# Patient Record
Sex: MALE | Race: BLACK OR AFRICAN AMERICAN | URBAN - METROPOLITAN AREA
[De-identification: names, ages, dates, MRNs, and addresses within clinical notes are randomized per-mention and may not be internally consistent; named-entity substitution may affect disease eponyms.]

---

## 2017-01-01 ENCOUNTER — OFFICE VISIT (OUTPATIENT)
Dept: UROLOGY | Facility: OTHER | Age: 57
End: 2017-01-01
Payer: COMMERCIAL

## 2017-01-01 ENCOUNTER — OFFICE VISIT (OUTPATIENT)
Dept: FAMILY MEDICINE | Facility: CLINIC | Age: 57
End: 2017-01-01
Payer: COMMERCIAL

## 2017-01-01 ENCOUNTER — OFFICE VISIT (OUTPATIENT)
Dept: UROLOGY | Facility: CLINIC | Age: 57
End: 2017-01-01
Payer: COMMERCIAL

## 2017-01-01 ENCOUNTER — RADIANT APPOINTMENT (OUTPATIENT)
Dept: ULTRASOUND IMAGING | Facility: CLINIC | Age: 57
End: 2017-01-01
Payer: COMMERCIAL

## 2017-01-01 VITALS
DIASTOLIC BLOOD PRESSURE: 92 MMHG | TEMPERATURE: 97.8 F | HEART RATE: 90 BPM | HEIGHT: 66 IN | WEIGHT: 162.6 LBS | BODY MASS INDEX: 26.13 KG/M2 | SYSTOLIC BLOOD PRESSURE: 142 MMHG | OXYGEN SATURATION: 100 %

## 2017-01-01 VITALS — RESPIRATION RATE: 14 BRPM | DIASTOLIC BLOOD PRESSURE: 96 MMHG | SYSTOLIC BLOOD PRESSURE: 170 MMHG | HEART RATE: 76 BPM

## 2017-01-01 VITALS — RESPIRATION RATE: 16 BRPM | DIASTOLIC BLOOD PRESSURE: 84 MMHG | HEART RATE: 80 BPM | SYSTOLIC BLOOD PRESSURE: 138 MMHG

## 2017-01-01 VITALS — DIASTOLIC BLOOD PRESSURE: 76 MMHG | RESPIRATION RATE: 16 BRPM | SYSTOLIC BLOOD PRESSURE: 118 MMHG | HEART RATE: 62 BPM

## 2017-01-01 VITALS — SYSTOLIC BLOOD PRESSURE: 124 MMHG | RESPIRATION RATE: 12 BRPM | DIASTOLIC BLOOD PRESSURE: 80 MMHG | HEART RATE: 68 BPM

## 2017-01-01 VITALS — RESPIRATION RATE: 12 BRPM | DIASTOLIC BLOOD PRESSURE: 78 MMHG | HEART RATE: 66 BPM | SYSTOLIC BLOOD PRESSURE: 120 MMHG

## 2017-01-01 DIAGNOSIS — R35.0 INCREASED FREQUENCY OF URINATION: Primary | ICD-10-CM

## 2017-01-01 DIAGNOSIS — R97.20 ELEVATED PROSTATE SPECIFIC ANTIGEN (PSA): Primary | ICD-10-CM

## 2017-01-01 DIAGNOSIS — I10 UNCONTROLLED HYPERTENSION: ICD-10-CM

## 2017-01-01 DIAGNOSIS — I10 ESSENTIAL HYPERTENSION WITH GOAL BLOOD PRESSURE LESS THAN 140/90: ICD-10-CM

## 2017-01-01 DIAGNOSIS — N40.1 BENIGN NON-NODULAR PROSTATIC HYPERPLASIA WITH LOWER URINARY TRACT SYMPTOMS: Primary | ICD-10-CM

## 2017-01-01 DIAGNOSIS — I10 HYPERTENSION GOAL BP (BLOOD PRESSURE) < 140/90: ICD-10-CM

## 2017-01-01 DIAGNOSIS — R35.0 URINARY FREQUENCY: Primary | ICD-10-CM

## 2017-01-01 DIAGNOSIS — R35.0 URINARY FREQUENCY: ICD-10-CM

## 2017-01-01 DIAGNOSIS — N40.1 BENIGN NON-NODULAR PROSTATIC HYPERPLASIA WITH LOWER URINARY TRACT SYMPTOMS: ICD-10-CM

## 2017-01-01 DIAGNOSIS — R97.20 ELEVATED PROSTATE SPECIFIC ANTIGEN (PSA): ICD-10-CM

## 2017-01-01 DIAGNOSIS — R35.0 INCREASED FREQUENCY OF URINATION: ICD-10-CM

## 2017-01-01 DIAGNOSIS — R39.9 UTI SYMPTOMS: ICD-10-CM

## 2017-01-01 LAB
ALBUMIN UR-MCNC: NEGATIVE MG/DL
APPEARANCE UR: CLEAR
BACTERIA SPEC CULT: ABNORMAL
BILIRUB UR QL STRIP: NEGATIVE
COLOR UR AUTO: YELLOW
COPATH REPORT: NORMAL
GLUCOSE UR STRIP-MCNC: NEGATIVE MG/DL
HBA1C MFR BLD: 5.6 % (ref 4.3–6)
HGB UR QL STRIP: ABNORMAL
HGB UR QL STRIP: NEGATIVE
HGB UR QL STRIP: NEGATIVE
KETONES UR STRIP-MCNC: NEGATIVE MG/DL
LEUKOCYTE ESTERASE UR QL STRIP: NEGATIVE
MICRO REPORT STATUS: ABNORMAL
MICROORGANISM SPEC CULT: ABNORMAL
NITRATE UR QL: NEGATIVE
PH UR STRIP: 5 PH (ref 5–7)
PH UR STRIP: 5.5 PH (ref 5–7)
PH UR STRIP: 6 PH (ref 5–7)
PSA SERPL-MCNC: 8.92 UG/L (ref 0–4)
PSA SERPL-MCNC: 9.41 UG/L (ref 0–4)
RBC #/AREA URNS AUTO: NORMAL /HPF (ref 0–2)
SP GR UR STRIP: 1.01 (ref 1–1.03)
SP GR UR STRIP: 1.02 (ref 1–1.03)
SP GR UR STRIP: 1.02 (ref 1–1.03)
SPECIMEN SOURCE: ABNORMAL
URN SPEC COLLECT METH UR: ABNORMAL
URN SPEC COLLECT METH UR: NORMAL
URN SPEC COLLECT METH UR: NORMAL
UROBILINOGEN UR STRIP-ACNC: 0.2 EU/DL (ref 0.2–1)
WBC #/AREA URNS AUTO: NORMAL /HPF (ref 0–2)

## 2017-01-01 PROCEDURE — 99213 OFFICE O/P EST LOW 20 MIN: CPT | Performed by: UROLOGY

## 2017-01-01 PROCEDURE — 84153 ASSAY OF PSA TOTAL: CPT | Performed by: UROLOGY

## 2017-01-01 PROCEDURE — 36415 COLL VENOUS BLD VENIPUNCTURE: CPT | Performed by: UROLOGY

## 2017-01-01 PROCEDURE — 87077 CULTURE AEROBIC IDENTIFY: CPT | Performed by: UROLOGY

## 2017-01-01 PROCEDURE — 99207 ZZC DROP WITH A PROCEDURE: CPT | Mod: 25 | Performed by: UROLOGY

## 2017-01-01 PROCEDURE — 81001 URINALYSIS AUTO W/SCOPE: CPT | Performed by: UROLOGY

## 2017-01-01 PROCEDURE — 87186 SC STD MICRODIL/AGAR DIL: CPT | Performed by: UROLOGY

## 2017-01-01 PROCEDURE — 88344 IMHCHEM/IMCYTCHM EA MLT ANTB: CPT | Performed by: UROLOGY

## 2017-01-01 PROCEDURE — 36415 COLL VENOUS BLD VENIPUNCTURE: CPT | Performed by: FAMILY MEDICINE

## 2017-01-01 PROCEDURE — 76942 ECHO GUIDE FOR BIOPSY: CPT | Performed by: UROLOGY

## 2017-01-01 PROCEDURE — 81003 URINALYSIS AUTO W/O SCOPE: CPT | Performed by: UROLOGY

## 2017-01-01 PROCEDURE — 81003 URINALYSIS AUTO W/O SCOPE: CPT | Performed by: FAMILY MEDICINE

## 2017-01-01 PROCEDURE — 83036 HEMOGLOBIN GLYCOSYLATED A1C: CPT | Performed by: FAMILY MEDICINE

## 2017-01-01 PROCEDURE — 99243 OFF/OP CNSLTJ NEW/EST LOW 30: CPT | Mod: 25 | Performed by: UROLOGY

## 2017-01-01 PROCEDURE — 51798 US URINE CAPACITY MEASURE: CPT | Performed by: UROLOGY

## 2017-01-01 PROCEDURE — 55700 HC BIOPSY PROSTATE NEEDLE/PUNCH: CPT | Performed by: UROLOGY

## 2017-01-01 PROCEDURE — 87070 CULTURE OTHR SPECIMN AEROBIC: CPT | Performed by: UROLOGY

## 2017-01-01 PROCEDURE — 88305 TISSUE EXAM BY PATHOLOGIST: CPT | Performed by: UROLOGY

## 2017-01-01 PROCEDURE — 99213 OFFICE O/P EST LOW 20 MIN: CPT | Mod: 25 | Performed by: UROLOGY

## 2017-01-01 PROCEDURE — 99213 OFFICE O/P EST LOW 20 MIN: CPT | Performed by: FAMILY MEDICINE

## 2017-01-01 PROCEDURE — 99000 SPECIMEN HANDLING OFFICE-LAB: CPT | Performed by: UROLOGY

## 2017-01-01 RX ORDER — LISINOPRIL 40 MG/1
40 TABLET ORAL DAILY
Qty: 90 TABLET | Refills: 3 | Status: SHIPPED | OUTPATIENT
Start: 2017-01-01

## 2017-01-01 RX ORDER — CIPROFLOXACIN 500 MG/1
500 TABLET, FILM COATED ORAL 2 TIMES DAILY
Qty: 6 TABLET | Refills: 0 | Status: SHIPPED | OUTPATIENT
Start: 2017-01-01 | End: 2017-01-01

## 2017-01-01 RX ORDER — OXYBUTYNIN CHLORIDE 10 MG/1
10 TABLET, EXTENDED RELEASE ORAL DAILY
Qty: 30 TABLET | Refills: 1 | Status: SHIPPED | OUTPATIENT
Start: 2017-01-01 | End: 2017-01-01

## 2017-01-01 RX ORDER — OXYBUTYNIN CHLORIDE 10 MG/1
10 TABLET, EXTENDED RELEASE ORAL DAILY
Qty: 30 TABLET | Refills: 1 | Status: SHIPPED | OUTPATIENT
Start: 2017-01-01

## 2017-01-01 RX ORDER — CIPROFLOXACIN 500 MG/1
500 TABLET, FILM COATED ORAL 2 TIMES DAILY
Qty: 20 TABLET | Refills: 0 | Status: SHIPPED | OUTPATIENT
Start: 2017-01-01 | End: 2017-01-01

## 2017-01-01 ASSESSMENT — ANXIETY QUESTIONNAIRES
GAD7 TOTAL SCORE: 2
5. BEING SO RESTLESS THAT IT IS HARD TO SIT STILL: NOT AT ALL
GAD7 TOTAL SCORE: 2
6. BECOMING EASILY ANNOYED OR IRRITABLE: NOT AT ALL
3. WORRYING TOO MUCH ABOUT DIFFERENT THINGS: SEVERAL DAYS
7. FEELING AFRAID AS IF SOMETHING AWFUL MIGHT HAPPEN: NOT AT ALL
2. NOT BEING ABLE TO STOP OR CONTROL WORRYING: SEVERAL DAYS
IF YOU CHECKED OFF ANY PROBLEMS ON THIS QUESTIONNAIRE, HOW DIFFICULT HAVE THESE PROBLEMS MADE IT FOR YOU TO DO YOUR WORK, TAKE CARE OF THINGS AT HOME, OR GET ALONG WITH OTHER PEOPLE: NOT DIFFICULT AT ALL
1. FEELING NERVOUS, ANXIOUS, OR ON EDGE: NOT AT ALL

## 2017-01-01 ASSESSMENT — PATIENT HEALTH QUESTIONNAIRE - PHQ9
SUM OF ALL RESPONSES TO PHQ QUESTIONS 1-9: 3
5. POOR APPETITE OR OVEREATING: NOT AT ALL

## 2017-01-11 NOTE — MR AVS SNAPSHOT
After Visit Summary   1/11/2017    Tres Santana Sr.    MRN: [id number]           Patient Information     Date Of Birth          1960        Visit Information        Provider Department      1/11/2017 3:30 PM Elza Zapien MD [facility]        Today's Diagnoses     UTI symptoms    -  1     Urinary frequency         Uncontrolled hypertension            Follow-ups after your visit        Additional Services     UROLOGY ADULT REFERRAL       Your provider has referred you to: G: [facility] [phone]   [email or web address]    Please be aware that coverage of these services is subject to the terms and limitations of your health insurance plan.  Call member services at your health plan with any benefit or coverage questions.      Please bring the following with you to your appointment:    (1) Any X-Rays, CTs or MRIs which have been performed.  Contact the facility where they were done to arrange for  prior to your scheduled appointment.    (2) List of current medications  (3) This referral request   (4) Any documents/labs given to you for this referral                  Follow-up notes from your care team     Return for Physical Exam at earliest convenience.      Who to contact     Normal or non-critical lab and imaging results will be communicated to you by MyChart, letter or phone within 4 business days after the clinic has received the results. If you do not hear from us within 7 days, please contact the clinic through MyChart or phone. If you have a critical or abnormal lab result, we will notify you by phone as soon as possible.  Submit refill requests through [employer] or call your pharmacy and they will forward the refill request to us. Please allow 3 business days for your refill to be completed.          If you need to speak with a  for additional information , please call: [phone]              "Additional Information About Your Visit        MyChart Information     [employer] lets you send messages to your doctor, view your test results, renew your prescriptions, schedule appointments and more. To sign up, go to [email or web address] . Click on \"Log in\" on the left side of the screen, which will take you to the Welcome page. Then click on \"Sign up Now\" on the right side of the page.     You will be asked to enter the access code listed below, as well as some personal information. Please follow the directions to create your username and password.     Your access code is: [email or web address]  Expires: 2017  4:14 PM     Your access code will  in 90 days. If you need help or a new code, please call your [place] clinic or [phone].        Care EveryWhere ID     This is your Care EveryWhere ID. This could be used by other organizations to access your [place] medical records  [id number]        Your Vitals Were     Pulse Temperature Height BMI (Body Mass Index) Pulse Oximetry       90 97.8  F (36.6  C) (Tympanic) 5' 6\" (1.676 m) 26.26 kg/m2 100%        Blood Pressure from Last 3 Encounters:   17 165/102   10/06/16 184/80   16 149/88    Weight from Last 3 Encounters:   17 162 lb 9.6 oz (73.755 kg)   10/06/16 162 lb 9.6 oz (73.755 kg)   16 163 lb 12.8 oz (74.299 kg)              We Performed the Following     DEPRESSION ACTION PLAN (DAP)     Hemoglobin A1c     UA reflex to Microscopic and Culture     UROLOGY ADULT REFERRAL        Primary Care Provider Office Phone # Fax #    Elza Zapien [phone]       [facility] [place] [address] [place] [address]        Thank you!     [facility]. Our goal is always to provide you with excellent care. Hearing back from our patients is one way we can continue to improve our services. Please take a few minutes to complete the written survey that you may receive " in the mail after your visit with us. Thank you!             Your Updated Medication List - Protect others around you: Learn how to safely use, store and throw away your medicines at www.disposemymeds.org.          This list is accurate as of: 1/11/17  4:14 PM.  Always use your most recent med list.                   Brand Name Dispense Instructions for use    lisinopril 40 MG tablet    PRINIVIL/ZESTRIL    90 tablet    Take 1 tablet (40 mg) by mouth daily

## 2017-01-11 NOTE — Clinical Note
[facility]  [address]  [place] [address] [phone]    January 12, 2017       Tres Santana Sr.  [address]  [place] [address]        Tres Ralph recent labs were normal.     No evidence of diabetes or urinary tract infection at this time.     Please schedule and follow up with the Urologist to further evaluate the urinary frequency.    Results for orders placed or performed in visit on 01/11/17   UA reflex to Microscopic and Culture   Result Value Ref Range    Color Urine Yellow     Appearance Urine Clear     Glucose Urine Negative NEG mg/dL    Bilirubin Urine Negative NEG    Ketones Urine Negative NEG mg/dL    Specific Gravity Urine 1.025 1.003 - 1.035    Blood Urine Negative NEG    pH Urine 5.5 5.0 - 7.0 pH    Protein Albumin Urine Negative NEG mg/dL    Urobilinogen Urine 0.2 0.2 - 1.0 EU/dL    Nitrite Urine Negative NEG    Leukocyte Esterase Urine Negative NEG    Source Midstream Urine    Hemoglobin A1c   Result Value Ref Range    Hemoglobin A1C 5.6 4.3 - 6.0 %     If you have any questions or concerns please call the clinic at [phone].    Elza Zapien M.D./becky

## 2017-01-11 NOTE — PROGRESS NOTES
SUBJECTIVE:                                                    Tres Santana Sr. is a 56 year old male who presents to clinic today for the following health issues:      Genitourinary - Male     Onset: x 3 weeks      Description:   Dysuria (painful urination): no   Hematuria (blood in urine): no   Frequency: YES  Are you urinating at night : YES  Hesitancy (delay in urine): no   Retention (unable to empty): no   Decrease in urinary flow: no   Incontinence: no     Progression of Symptoms:  worsening    Accompanying Signs & Symptoms:  Fever: no   Back/Flank pain: no   Urethral discharge: no   Testicle lumps/masses/pain: no   Nausea and/or vomiting: no   Abdominal pain: no    History:   History of frequent UTI's: no   History of kidney stones: no   History of hernias: YES  Personal or Family history of Prostate problems: no  Sexually active: YES    Precipitating factors:   none    Alleviating factors:  none         Therapies Tried and outcome: none      I-PSS score of 6 ( frequency-3 + urgency-3), all else 0.  Denies having any fever or chills.    Blood pressure is elevated, has a known history of hypertension. Patient is hesitant to have his blood pressure medications adjusted for better blood pressure control.    BP Readings from Last 3 Encounters:   01/11/17 142/92   10/06/16 184/80   07/13/16 149/88         Problem list and histories reviewed & adjusted, as indicated.  Additional history: as documented    Patient Active Problem List   Diagnosis     Keloid scar     Hypertension goal BP (blood pressure) < 140/90     Proptosis     CKD (chronic kidney disease) stage 3, GFR 30-59 ml/min     Mild depression     Insomnia, unspecified type     Past Surgical History   Procedure Laterality Date     Bunionectomy       Bilateral feet     Hernia repair, inguinal rt/lt       Appendectomy         Social History   Substance Use Topics     Smoking status: Never Smoker      Smokeless tobacco: Not on file     Alcohol Use: 0.0  "oz/week     0 Standard drinks or equivalent per week      Comment: social     Family History   Problem Relation Age of Onset     CANCER No family hx of      DIABETES No family hx of      Coronary Artery Disease No family hx of      Hypertension Mother      Hypertension Father      CEREBROVASCULAR DISEASE Maternal Uncle      at age 60s     Colon Cancer No family hx of      Prostate Cancer No family hx of          Current Outpatient Prescriptions   Medication Sig Dispense Refill     lisinopril (PRINIVIL,ZESTRIL) 40 MG tablet Take 1 tablet (40 mg) by mouth daily 90 tablet 3     No Known Allergies    ROS:  Constitutional, HEENT, cardiovascular, pulmonary, gi and gu systems are negative, except as otherwise noted.    OBJECTIVE:                                                    /92 mmHg  Pulse 90  Temp(Src) 97.8  F (36.6  C) (Tympanic)  Ht 5' 6\" (1.676 m)  Wt 162 lb 9.6 oz (73.755 kg)  BMI 26.26 kg/m2  SpO2 100%  Body mass index is 26.26 kg/(m^2).  GENERAL: healthy, alert and no distress  RECTAL/PROSTATE EXAM: Patient declined exam.    Diagnostic Test Results:  Results for orders placed or performed in visit on 01/11/17   UA reflex to Microscopic and Culture   Result Value Ref Range    Color Urine Yellow     Appearance Urine Clear     Glucose Urine Negative NEG mg/dL    Bilirubin Urine Negative NEG    Ketones Urine Negative NEG mg/dL    Specific Gravity Urine 1.025 1.003 - 1.035    Blood Urine Negative NEG    pH Urine 5.5 5.0 - 7.0 pH    Protein Albumin Urine Negative NEG mg/dL    Urobilinogen Urine 0.2 0.2 - 1.0 EU/dL    Nitrite Urine Negative NEG    Leukocyte Esterase Urine Negative NEG    Source Midstream Urine    Hemoglobin A1c   Result Value Ref Range    Hemoglobin A1C 5.6 4.3 - 6.0 %          ASSESSMENT/PLAN:                                                    Tres was seen today for uti.    Diagnoses and all orders for this visit:      Urinary frequency  -     UA reflex to Microscopic and Culture  -  "    Hemoglobin A1c        -      UROLOGY ADULT REFERRAL      Uncontrolled hypertension  Repeat BP at the end of the visit improved but was still elevated above.   Recommended medication adjustment. Patient declined at this time.    Other orders  -     DEPRESSION ACTION PLAN (DAP)      Follow up if symptoms fail to improve. Patient verbalized understanding.  Schedule a Physical Exam at earliest convenience.       Elza Zapien MD  [facility]

## 2017-01-11 NOTE — Clinical Note
My Depression Action Plan  Name: Tres Santana Sr.   Date of Birth 1960  Date: 1/11/2017    My doctor: Elza Zapien   [facility]  [address]  [place] [id number] [phone]          GREEN    ZONE   Good Control    What it looks like:     Things are going generally well. You have normal up s and down s. You may even feel depressed from time to time, but bad moods usually last less than a day.   What you need to do:  1. Continue to care for yourself (see self care plan)  2. Check your depression survival kit and update it as needed  3. Follow your physician s recommendations including any medication.  4. Do not stop taking medication unless you consult with your physician first.           YELLOW         ZONE Getting Worse    What it looks like:     Depression is starting to interfere with your life.     It may be hard to get out of bed; you may be starting to isolate yourself from others.    Symptoms of depression are starting to last most all day and this has happened for several days.     You may have suicidal thoughts but they are not constant.   What you need to do:     1. Call your care team, your response to treatment will improve if you keep your care team informed of your progress. Yellow periods are signs an adjustment may need to be made.     2. Continue your self-care, even if you have to fake it!    3. Talk to someone in your support network    4. Open up your depression survival kit           RED    ZONE Medical Alert - Get Help    What it looks like:     Depression is seriously interfering with your life.     You may experience these or other symptoms: You can t get out of bed most days, can t work or engage in other necessary activities, you have trouble taking care of basic hygiene, or basic responsibilities, thoughts of suicide or death that will not go away, self-injurious behavior.     What you need to do:  1. Call your care  team and request a same-day appointment. If they are not available (weekends or after hours) call your local crisis line, emergency room or 911.      Electronically signed by: Thelma Ruiz, January 11, 2017    Depression Self Care Plan / Survival Kit    Self-Care for Depression  Here s the deal. Your body and mind are really not as separate as most people think.  What you do and think affects how you feel and how you feel influences what you do and think. This means if you do things that people who feel good do, it will help you feel better.  Sometimes this is all it takes.  There is also a place for medication and therapy depending on how severe your depression is, so be sure to consult with your medical provider and/ or Behavioral Health Consultant if your symptoms are worsening or not improving.     In order to better manage my stress, I will:    Exercise  Get some form of exercise, every day. This will help reduce pain and release endorphins, the  feel good  chemicals in your brain. This is almost as good as taking antidepressants!  This is not the same as joining a gym and then never going! (they count on that by the way ) It can be as simple as just going for a walk or doing some gardening, anything that will get you moving.      Hygiene   Maintain good hygiene (Get out of bed in the morning, Make your bed, Brush your teeth, Take a shower, and Get dressed like you were going to work, even if you are unemployed).  If your clothes don't fit try to get ones that do.    Diet  I will strive to eat foods that are good for me, drink plenty of water, and avoid excessive sugar, caffeine, alcohol, and other mood-altering substances.  Some foods that are helpful in depression are: complex carbohydrates, B vitamins, flaxseed, fish or fish oil, fresh fruits and vegetables.    Psychotherapy  I agree to participate in Individual Therapy (if recommended).    Medication  If prescribed medications, I agree to take  them.  Missing doses can result in serious side effects.  I understand that drinking alcohol, or other illicit drug use, may cause potential side effects.  I will not stop my medication abruptly without first discussing it with my provider.    Staying Connected With Others  I will stay in touch with my friends, family members, and my primary care provider/team.    Use your imagination  Be creative.  We all have a creative side; it doesn t matter if it s oil painting, sand castles, or mud pies! This will also kick up the endorphins.    Witness Beauty  (AKA stop and smell the roses) Take a look outside, even in mid-winter. Notice colors, textures. Watch the squirrels and birds.     Service to others  Be of service to others.  There is always someone else in need.  By helping others we can  get out of ourselves  and remember the really important things.  This also provides opportunities for practicing all the other parts of the program.    Humor  Laugh and be silly!  Adjust your TV habits for less news and crime-drama and more comedy.    Control your stress  Try breathing deep, massage therapy, biofeedback, and meditation. Find time to relax each day.     My support system    Clinic Contact:  Phone number:    Contact 1:  Phone number:    Contact 2:  Phone number:    Jewish/:  Phone number:    Therapist:  Phone number:    Local crisis center:    Phone number:    Other community support:  Phone number:

## 2017-01-11 NOTE — NURSING NOTE
"Chief Complaint   Patient presents with     UTI       Initial /102 mmHg  Pulse 90  Temp(Src) 97.8  F (36.6  C) (Tympanic)  Ht 5' 6\" (1.676 m)  Wt 162 lb 9.6 oz (73.755 kg)  BMI 26.26 kg/m2  SpO2 100% Estimated body mass index is 26.26 kg/(m^2) as calculated from the following:    Height as of this encounter: 5' 6\" (1.676 m).    Weight as of this encounter: 162 lb 9.6 oz (73.755 kg).  BP completed using cuff size: joy Ruiz MA      "

## 2017-01-12 NOTE — PROGRESS NOTES
Quick Note:    Please send a result letter on clinic letterhead with results along with the following instructions      Mr. Santana      Your recent labs were normal.     No evidence of diabetes or urinary tract infection at this time.     Please schedule and follow up with the Urologist to further evaluate the urinary frequency.    Please contact the clinic if you have any additional questions or concerns.    Sincerely,      Elza Zapien M.D    [facility] [place]    ______

## 2017-03-14 NOTE — NURSING NOTE
"Chief Complaint   Patient presents with     Consult       Initial BP (!) 170/96 (BP Location: Right arm, Patient Position: Chair, Cuff Size: Adult Regular)  Pulse 76  Resp 14 Estimated body mass index is 26.24 kg/(m^2) as calculated from the following:    Height as of 1/11/17: 1.676 m (5' 6\").    Weight as of 1/11/17: 73.8 kg (162 lb 9.6 oz).  Medication Reconciliation: complete   Libby Dewitt, RADHA      "

## 2017-03-14 NOTE — MR AVS SNAPSHOT
"              After Visit Summary   3/14/2017    Tres Santana Sr.    MRN: [id number]           Patient Information     Date Of Birth          1960        Visit Information        Provider Department      3/14/2017 4:15 PM Moises Daly MD [facility]        Today's Diagnoses     Benign non-nodular prostatic hyperplasia with lower urinary tract symptoms    -  1       Follow-ups after your visit        Who to contact     If you have questions or need follow up information about today's clinic visit or your schedule please contact [facility] directly at [phone].  Normal or non-critical lab and imaging results will be communicated to you by [employer], letter or phone within 4 business days after the clinic has received the results. If you do not hear from us within 7 days, please contact the clinic through [employer] or phone. If you have a critical or abnormal lab result, we will notify you by phone as soon as possible.  Submit refill requests through [employer] or call your pharmacy and they will forward the refill request to us. Please allow 3 business days for your refill to be completed.          Additional Information About Your Visit        MyChart Information     [employer] lets you send messages to your doctor, view your test results, renew your prescriptions, schedule appointments and more. To sign up, go to [email or web address] . Click on \"Log in\" on the left side of the screen, which will take you to the Welcome page. Then click on \"Sign up Now\" on the right side of the page.     You will be asked to enter the access code listed below, as well as some personal information. Please follow the directions to create your username and password.     Your access code is: [email or web address]  Expires: 2017  5:14 PM     Your access code will  in 90 days. If you need help or a new code, please call your [facility] or [phone].        Care EveryWhere ID     This is " your Care EveryWhere ID. This could be used by other organizations to access your [place] medical records  [id number]        Your Vitals Were     Pulse Respirations                76 14           Blood Pressure from Last 3 Encounters:   03/14/17 (!) 170/96   01/11/17 (!) 142/92   10/06/16 184/80    Weight from Last 3 Encounters:   01/11/17 73.8 kg (162 lb 9.6 oz)   10/06/16 73.8 kg (162 lb 9.6 oz)   07/13/16 74.3 kg (163 lb 12.8 oz)              We Performed the Following     MEASURE POST-VOID RESIDUAL URINE/BLADDER CAPACITY, US NON-IMAGING     PSA tumor marker     UA reflex to Microscopic and Culture        Primary Care Provider Office Phone # Fax #    Elza Zapien [phone]       [facility] [place] [address]  [place] [address]        Thank you!     Thank you for [facility]  [facility]. Our goal is always to provide you with excellent care. Hearing back from our patients is one way we can continue to improve our services. Please take a few minutes to complete the written survey that you may receive in the mail after your visit with us. Thank you!             Your Updated Medication List - Protect others around you: Learn how to safely use, store and throw away your medicines at www.disposemymeds.org.          This list is accurate as of: 3/14/17  4:36 PM.  Always use your most recent med list.                   Brand Name Dispense Instructions for use    lisinopril 40 MG tablet    PRINIVIL/ZESTRIL    90 tablet    Take 1 tablet (40 mg) by mouth daily

## 2017-03-14 NOTE — PROGRESS NOTES
S: Tres Santana Sr. is a pleasant  56 year old male who was requested to be seen by  Elza Zapien for a consult with regard to patient's urinary complaints.  Patient complains of Nocturia x 2-3, Urgency and Frequency.  He has no history of elevated PSA.  Symptoms have been on going for   several month(s).  His AUA Symptom Score:  6.  His QOL score:  2.    Current Outpatient Prescriptions   Medication Sig Dispense Refill     lisinopril (PRINIVIL,ZESTRIL) 40 MG tablet Take 1 tablet (40 mg) by mouth daily 90 tablet 3     No Known Allergies  Past Medical History   Diagnosis Date     Chronic kidney disease      Hypertension      Past Surgical History   Procedure Laterality Date     Bunionectomy       Bilateral feet     Hernia repair, inguinal rt/lt       Appendectomy        Family History   Problem Relation Age of Onset     CANCER No family hx of      DIABETES No family hx of      Coronary Artery Disease No family hx of      Hypertension Mother      Hypertension Father      CEREBROVASCULAR DISEASE Maternal Uncle      at age 60s     Colon Cancer No family hx of      Prostate Cancer No family hx of      He does not have a family history of prostate cancer.  Social History     Social History     Marital status:      Spouse name: shara     Number of children: 5     Years of education: N/A     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[employer]      Social History Main Topics     Smoking status: Never Smoker     Smokeless tobacco: Never Used     Alcohol use 0.0 oz/week     0 Standard drinks or equivalent per week      Comment: social     Drug use: No     Sexual activity: Yes     Partners: Female     Birth control/ protection: None     Other Topics Concern     None     Social History Narrative        REVIEW OF SYSTEMS  =================  C: NEGATIVE for fever, chills, change in weight  I: NEGATIVE for worrisome rashes, moles or lesions  E/M: NEGATIVE for ear, mouth and throat problems  R: NEGATIVE  for significant cough or SHORTNESS OF BREATH  CV:  NEGATIVE for chest pain, palpitations or peripheral edema  GI: NEGATIVE for nausea, abdominal pain, heartburn, or change in bowel habits  NEURO: NEGATIVE numbness/weakness  : see HPI  PSYCH: NEGATIVE depression/anxiety  LYmph: no new enlarged lymph nodes  Ortho: no new trauma/movements           O: Exam:  Constitutional: healthy, alert and no distress  Head: Normocephalic. No masses, lesions, tenderness or abnormalities  ENT: ENT exam normal, no neck nodes or sinus tenderness  Cardiovascular: negative, PMI normal.   Respiratory: negative, no evidence of respiratory distress  Gastrointestinal: Abdomen soft, non-tender. BS normal. No masses, organomegaly  : penis no d/c. Testis no masses.  No scrotal skin lesion.  Prostate 45 gm smooth no nodule  Musculoskeletal: extremities normal- no gross deformities noted, gait normal and normal muscle tone  Skin: no suspicious lesions or rashes  Neurologic: Alert and oriented  Psychiatric: mentation appears normal. and affect normal/bright  Hematologic/Lymphatic/Immunologic: normal ant/post cervical, axillary, supraclavicular and inguinal nodes    Assessment/Plan:   (N40.1) Benign non-nodular prostatic hyperplasia with lower urinary tract symptoms  (primary encounter diagnosis)  Comment: bladder scan 25 ml  Plan:  UA/PSA today    Recommendations:   Medical management versus surgical management versus office treatments, were discussed with patient at length. Pros and Cons discussed.  Patient does not want any treatments at this time.  F/u with when symptoms worsen.    HTN: Patient to follow up with Primary Care provider regarding elevated blood pressure.

## 2017-04-11 NOTE — MR AVS SNAPSHOT
After Visit Summary   4/11/2017    Tres Santana Sr.    MRN: [id number]           Patient Information     Date Of Birth          1960        Visit Information        Provider Department      4/11/2017 11:15 AM Moises Daly MD [facility] [place]        Today's Diagnoses     Elevated prostate specific antigen (PSA)    -  1    Urinary frequency          Care Instructions    Please follow up with Dr. Daly in  3 months. You may stop at the  to arrange a lab appointment and follow up appointment one week later for results.        Follow-ups after your visit        Your next 10 appointments already scheduled     Apr 11, 2017 11:15 AM CDT   Return Visit with Moises Daly MD   [facility] [place] ([facility])    [address] [place] [id number] [phone]              Future tests that were ordered for you today     Open Future Orders        Priority Expected Expires Ordered    PSA tumor marker Routine 7/11/2017 4/12/2018 4/11/2017            Who to contact     If you have questions or need follow up information about today's clinic visit or your schedule please contact [facility] directly at [phone].  Normal or non-critical lab and imaging results will be communicated to you by MyChart, letter or phone within 4 business days after the clinic has received the results. If you do not hear from us within 7 days, please contact the clinic through [employer] or phone. If you have a critical or abnormal lab result, we will notify you by phone as soon as possible.  Submit refill requests through [employer] or call your pharmacy and they will forward the refill request to us. Please allow 3 business days for your refill to be completed.          Additional Information About Your Visit        MyChart Information     [employer] lets you send messages to your doctor, view your test results, renew your prescriptions, schedule appointments  "and more. To sign up, go to [email or web address] . Click on \"Log in\" on the left side of the screen, which will take you to the Welcome page. Then click on \"Sign up Now\" on the right side of the page.     You will be asked to enter the access code listed below, as well as some personal information. Please follow the directions to create your username and password.     Your access code is: [email or web address]  Expires: 2017  5:14 PM     Your access code will  in 90 days. If you need help or a new code, please call your [place] clinic or [phone].        Care EveryWhere ID     This is your Care EveryWhere ID. This could be used by other organizations to access your [place] medical records  [id number]        Your Vitals Were     Pulse Respirations                80 16           Blood Pressure from Last 3 Encounters:   17 138/84   17 (!) 170/96   17 (!) 142/92    Weight from Last 3 Encounters:   17 73.8 kg (162 lb 9.6 oz)   10/06/16 73.8 kg (162 lb 9.6 oz)   16 74.3 kg (163 lb 12.8 oz)                 Today's Medication Changes          These changes are accurate as of: 17 11:14 AM.  If you have any questions, ask your nurse or doctor.               Start taking these medicines.        Dose/Directions    ciprofloxacin 500 MG tablet   Commonly known as:  CIPRO   Used for:  Elevated prostate specific antigen (PSA), Urinary frequency   Started by:  Moises Daly MD        Dose:  500 mg   Take 1 tablet (500 mg) by mouth 2 times daily   Quantity:  20 tablet   Refills:  0            Where to get your medicines      These medications were sent to [facility] - [address] [facility]  [address] [facility] [address] [place] [address]     Phone:  [phone]     ciprofloxacin 500 MG tablet                Primary Care Provider Office Phone # Fax #    Elza Zapien [phone]       [facility] [address] [facility] [address] [facility] [address] [place] " [place] [address]        Thank you!     Thank you for choosing [facility]  for your care. Our goal is always to provide you with excellent care. Hearing back from our patients is one way we can continue to improve our services. Please take a few minutes to complete the written survey that you may receive in the mail after your visit with us. Thank you!             Your Updated Medication List - Protect others around you: Learn how to safely use, store and throw away your medicines at www.disposemymeds.org.          This list is accurate as of: 4/11/17 11:14 AM.  Always use your most recent med list.                   Brand Name Dispense Instructions for use    ciprofloxacin 500 MG tablet    CIPRO    20 tablet    Take 1 tablet (500 mg) by mouth 2 times daily       lisinopril 40 MG tablet    PRINIVIL/ZESTRIL    90 tablet    Take 1 tablet (40 mg) by mouth daily

## 2017-04-11 NOTE — NURSING NOTE
"Chief Complaint   Patient presents with     RECHECK     psa results       Initial /84 (BP Location: Right arm, Patient Position: Chair, Cuff Size: Adult Regular)  Pulse 80  Resp 16 Estimated body mass index is 26.24 kg/(m^2) as calculated from the following:    Height as of 1/11/17: 1.676 m (5' 6\").    Weight as of 1/11/17: 73.8 kg (162 lb 9.6 oz).  Medication Reconciliation: complete    "

## 2017-04-11 NOTE — PATIENT INSTRUCTIONS
Please follow up with Dr. Daly in  3 months. You may stop at the  to arrange a lab appointment and follow up appointment one week later for results.

## 2017-04-11 NOTE — PROGRESS NOTES
Chief Complaint   Patient presents with     RECHECK     psa results       Tres Santana Sr. is a 56 year old male who presents today for follow up of   Chief Complaint   Patient presents with     RECHECK     psa results    f/u for to discuss elevated psa/urinary issues.  He still has urgency/frequency but no dysuria/hematuria.    Current Outpatient Prescriptions   Medication Sig Dispense Refill     ciprofloxacin (CIPRO) 500 MG tablet Take 1 tablet (500 mg) by mouth 2 times daily 20 tablet 0     lisinopril (PRINIVIL,ZESTRIL) 40 MG tablet Take 1 tablet (40 mg) by mouth daily 90 tablet 3     No Known Allergies   Past Medical History:   Diagnosis Date     Chronic kidney disease      Hypertension      Past Surgical History:   Procedure Laterality Date     APPENDECTOMY       BUNIONECTOMY      Bilateral feet     HERNIA REPAIR, INGUINAL RT/LT       Family History   Problem Relation Age of Onset     Hypertension Mother      Hypertension Father      CEREBROVASCULAR DISEASE Maternal Uncle      at age 60s     CANCER No family hx of      DIABETES No family hx of      Coronary Artery Disease No family hx of      Colon Cancer No family hx of      Prostate Cancer No family hx of      Social History     Social History     Marital status:      Spouse name: shara     Number of children: 5     Years of education: N/A     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[employer]      Social History Main Topics     Smoking status: Never Smoker     Smokeless tobacco: Never Used     Alcohol use 0.0 oz/week     0 Standard drinks or equivalent per week      Comment: social     Drug use: No     Sexual activity: Yes     Partners: Female     Birth control/ protection: None     Other Topics Concern     None     Social History Narrative       REVIEW OF SYSTEMS  =================  C: NEGATIVE for fever, chills, change in weight  I: NEGATIVE for worrisome rashes, moles or lesions  E/M: NEGATIVE for ear, mouth and throat problems  R:  NEGATIVE for significant cough or SHORTNESS OF BREATH,   CV: NEGATIVE for chest pain, palpitations or peripheral edema  GI: NEGATIVE for nausea, abdominal pain, heartburn, or change in bowel habits  NEURO: NEGATIVE any motor/sensory changes  PSYCH: NEGATIVE for recent mood disorder    Physical Exam:  /84 (BP Location: Right arm, Patient Position: Chair, Cuff Size: Adult Regular)  Pulse 80  Resp 16   Patient is pleasant, in no acute distress, good general condition.  Lung: no evidence of respiratory distress    Abdomen: Soft, nondistended, non tender. No masses. No rebound or guarding.   Exam: no cva tenderness  Skin: Warm and dry.  No redness.  Psych: normal mood and affect  Neuro: alert and oriented    Assessment/Plan:   (R97.20) Elevated prostate specific antigen (PSA)  (primary encounter diagnosis)  Comment: discussed psa elevation  Plan: given recent changes in urinary symptoms and elevated psa, he might have prostatitis.  Will start him on cipro and recheck psa in 3 months.  Briefly discussed biopsy if psa still elevated in future.    (R35.0) Urinary frequency  Comment:    Plan: ciprofloxacin (CIPRO) 500 MG tablet        See above.

## 2017-05-10 NOTE — MR AVS SNAPSHOT
"              After Visit Summary   5/10/2017    Tres Santana Sr.    MRN: [id number]           Patient Information     Date Of Birth          1960        Visit Information        Provider Department      5/10/2017 10:30 AM Moises Daly MD [facility]        Today's Diagnoses     Increased frequency of urination    -  1    Elevated prostate specific antigen (PSA)           Follow-ups after your visit        Your next 10 appointments already scheduled     Jul 14, 2017 10:00 AM CDT   Return Visit with Moises Daly MD   [facility] ([address]  [place] [address] [phone]              Who to contact     If you have questions or need follow up information about today's clinic visit or your schedule please contact [facility] directly at [phone].  Normal or non-critical lab and imaging results will be communicated to you by MyChart, letter or phone within 4 business days after the clinic has received the results. If you do not hear from us within 7 days, please contact the clinic through MyChart or phone. If you have a critical or abnormal lab result, we will notify you by phone as soon as possible.  Submit refill requests through [employer] or call your pharmacy and they will forward the refill request to us. Please allow 3 business days for your refill to be completed.          Additional Information About Your Visit        MyChart Information     [employer]likdate lets you send messages to your doctor, view your test results, renew your prescriptions, schedule appointments and more. To sign up, go to [email or web address] . Click on \"Log in\" on the left side of the screen, which will take you to the Welcome page. Then click on \"Sign up Now\" on the right side of the page.     You will be asked to enter the access code listed below, as well as some personal information. Please follow the directions to create " your username and password.     Your access code is: [email or web address]  Expires: 2017 10:46 AM     Your access code will  in 90 days. If you need help or a new code, please call your [place] clinic or [phone].        Care EveryWhere ID     This is your Care EveryWhere ID. This could be used by other organizations to access your [place] medical records  [id number]        Your Vitals Were     Pulse Respirations                68 12           Blood Pressure from Last 3 Encounters:   05/10/17 124/80   17 138/84   17 (!) 170/96    Weight from Last 3 Encounters:   17 162 lb 9.6 oz (73.8 kg)   10/06/16 162 lb 9.6 oz (73.8 kg)   16 163 lb 12.8 oz (74.3 kg)              We Performed the Following     *UA reflex to Microscopic and Culture ([place] and [facility] (except [place] and [place])     MEASURE POST-VOID RESIDUAL URINE/BLADDER CAPACITY, US NON-IMAGING     PSA tumor marker          Today's Medication Changes          These changes are accurate as of: 5/10/17 10:46 AM.  If you have any questions, ask your nurse or doctor.               Start taking these medicines.        Dose/Directions    oxybutynin 10 MG 24 hr tablet   Commonly known as:  DITROPAN XL   Used for:  Increased frequency of urination   Started by:  Moises Daly MD        Dose:  10 mg   Take 1 tablet (10 mg) by mouth daily   Quantity:  30 tablet   Refills:  1         Stop taking these medicines if you haven't already. Please contact your care team if you have questions.     ciprofloxacin 500 MG tablet   Commonly known as:  CIPRO   Stopped by:  Moises Daly MD                Where to get your medicines      These medications were sent to [facility] - [address] [facility]  [address] [address] [place] [address]     Phone:  [phone]     oxybutynin 10 MG 24 hr tablet                Primary Care Provider Office Phone # Fax #    Elza Zapien [phone]  [phone]       [facility] [place] [address]  [place] [address]        Thank you!     Thank you for choosing [facility]  for your care. Our goal is always to provide you with excellent care. Hearing back from our patients is one way we can continue to improve our services. Please take a few minutes to complete the written survey that you may receive in the mail after your visit with us. Thank you!             Your Updated Medication List - Protect others around you: Learn how to safely use, store and throw away your medicines at www.disposemymeds.org.          This list is accurate as of: 5/10/17 10:46 AM.  Always use your most recent med list.                   Brand Name Dispense Instructions for use    lisinopril 40 MG tablet    PRINIVIL/ZESTRIL    90 tablet    Take 1 tablet (40 mg) by mouth daily       oxybutynin 10 MG 24 hr tablet    DITROPAN XL    30 tablet    Take 1 tablet (10 mg) by mouth daily

## 2017-05-10 NOTE — PROGRESS NOTES
Chief Complaint   Patient presents with     RECHECK       Tres Santana Sr. is a 56 year old male who presents today for follow up of   Chief Complaint   Patient presents with     RECHECK    f/u for frequency of urination and elevated psa.  He has finished his abx.  Recent psa is not yet done.  His AUA score is 8.  He has no obstructive voiding symptoms/dysuria/hematuria.    Current Outpatient Prescriptions   Medication Sig Dispense Refill     lisinopril (PRINIVIL,ZESTRIL) 40 MG tablet Take 1 tablet (40 mg) by mouth daily 90 tablet 3     No Known Allergies   Past Medical History:   Diagnosis Date     Chronic kidney disease      Hypertension      Past Surgical History:   Procedure Laterality Date     APPENDECTOMY       BUNIONECTOMY      Bilateral feet     HERNIA REPAIR, INGUINAL RT/LT       Family History   Problem Relation Age of Onset     Hypertension Mother      Hypertension Father      CEREBROVASCULAR DISEASE Maternal Uncle      at age 60s     CANCER No family hx of      DIABETES No family hx of      Coronary Artery Disease No family hx of      Colon Cancer No family hx of      Prostate Cancer No family hx of      Social History     Social History     Marital status:      Spouse name: shara     Number of children: 5     Years of education: N/A     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[employer]      Social History Main Topics     Smoking status: Never Smoker     Smokeless tobacco: Never Used     Alcohol use 0.0 oz/week     0 Standard drinks or equivalent per week      Comment: social     Drug use: No     Sexual activity: Yes     Partners: Female     Birth control/ protection: None     Other Topics Concern     None     Social History Narrative       REVIEW OF SYSTEMS  =================  C: NEGATIVE for fever, chills, change in weight  I: NEGATIVE for worrisome rashes, moles or lesions  E/M: NEGATIVE for ear, mouth and throat problems  R: NEGATIVE for significant cough or SHORTNESS OF  BREATH,   CV: NEGATIVE for chest pain, palpitations or peripheral edema  GI: NEGATIVE for nausea, abdominal pain, heartburn, or change in bowel habits  NEURO: NEGATIVE any motor/sensory changes  PSYCH: NEGATIVE for recent mood disorder    Physical Exam:  /80 (BP Location: Left arm, Patient Position: Chair, Cuff Size: Adult Large)  Pulse 68  Resp 12   Patient is pleasant, in no acute distress, good general condition.  Lung: no evidence of respiratory distress    Abdomen: Soft, nondistended, non tender. No masses. No rebound or guarding.   Exam: bladder scan minimal residual urine  Skin: Warm and dry.  No redness.  Psych: normal mood and affect  Neuro: alert and oriented    Assessment/Plan:   (R35.0) Increased frequency of urination  (primary encounter diagnosis)  Comment:    Plan: repeat UA today           Trial of ditropan           Side effects discussed    (R97.20) Elevated prostate specific antigen (PSA)  Comment:    Plan: repeat psa today           If still elevated, needs biopsy.

## 2017-05-10 NOTE — NURSING NOTE
"Chief Complaint   Patient presents with     RECHECK       Initial /80 (BP Location: Left arm, Patient Position: Chair, Cuff Size: Adult Large)  Pulse 68  Resp 12 Estimated body mass index is 26.24 kg/(m^2) as calculated from the following:    Height as of 1/11/17: 5' 6\" (1.676 m).    Weight as of 1/11/17: 162 lb 9.6 oz (73.8 kg).  Medication Reconciliation: complete   Libby Dewitt, RADHA      "

## 2017-05-31 NOTE — PATIENT INSTRUCTIONS
Prostate Biopsy  Cancer occurs when abnormal cells form a tumor. A tumor is a lump of cells that grow uncontrolled. A core needle biopsy will be done if your health care provider thinks you have prostate cancer. A thin needle is used to remove small samples of prostate tissue. These samples are checked for cancer.        Taking tissue samples  A biopsy takes about 15 to 20 minutes. You may be given an enema or suppository before the biopsy to clear the bowels. Antibiotics are given at least 1 hour prior to the biopsy. During the procedure:    You will be given antibiotics to stop infection.    You may be given a sedative, local pain killer, or pain medicine.    A small probe is put into the rectum as you lie on your side. A picture of your prostate can then be seen on a monitor. This is called a transrectal ultrasound (TRUS).    Your health care provider will use the TRUS picture as a guide. He or she will use a thin needle to remove tiny tissue samples from some sites in the prostate.    These tissue samples are sent to the pathology department. They are looked at under a microscope so a diagnosis can be made.   Risks and complications of core needle biopsy    Infection    Blood in urine, stool, or semen    Pain   Home care  You may have had the biopsy done through your rectum, your urethra, or through the skin between your scrotum and rectum. Your health care provider will tell you what to do after the biopsy. These instructions are based on your health condition, the type of biopsy, and your provider s practices.    Your provider may give you pain medicine or acetaminophen for discomfort or pain. Follow your provider s instructions for taking these medicines. Don t take aspirin or ibuprofen after the procedure. If you have a chronic liver or kidney disease, talk with your provider before taking these medicines. Also talk with your provider if you ve had a stomach ulcer or GI bleeding.     You may need to take  antibiotic medicine for 1 to 2 days. This will help prevent an infection. Signs of an infection include chills, pain, or fever.    You may be told to drink 8 ounces of water every 30 minutes for 2 hours. This will help ease any discomfort. You can also take a warm bath or put a warm, damp washcloth over your urethra to help ease the pain.    You may see minor bleeding after the procedure. This is normal and usually needs no treatment. You may see blood in your urine or semen (rust color). You may also have light bleeding from your rectum if you have hemorrhoids.    Your provider will tell you when you can go back to your normal activities. This includes sex, exercise, and straining physically. Discuss these with your provider.  When to seek medical advice  Call your health care provider right away if any of these occur:    You aren t able to urinate, or see that you have less flow of urine    Chills and fever of 100.4 F (38 C)      Severe pain    Signs of your infection getting worse. These include worsening pain, pain in your side under the rib cage or in the low back, or foul-smelling urine.    Blood clots or bright red blood in your stool or urine    You feel confused or very tired    1608-3188 [employer]. [address] [place] [address], [place] [address]. All rights reserved. This information is not intended as a substitute for professional medical care. Always follow your healthcare professional's instructions.        Transrectal Ultrasound and Biopsy  A transrectal ultrasound is an imaging test. It uses sound waves to create pictures of a man s prostate gland. Your prostate gland is in front of your rectum. For this test, a special probe (transducer) is placed directly into your rectum. During the test, tissue samples (a biopsy) may also be taken. The test is done by a specially trained technologist called a sonographer.    Getting ready for your test    You may be asked to clear your bowel before the  test. This may be done by injecting liquid into your rectum (an enema). Or it can be done by drinking a special liquid.    You may be asked not to eat or drink anything after midnight the night before the test.    Tell your health care provider about any medicines, herbs, or supplements you are taking. This includes any over-the-counter medicines such as aspirin or ibuprofen.    Answer any questions your provider has about your medical history. This will help your provider tailor the test to your health needs.  During your test    You may be asked to change into a gown. You will then lie on your side on an exam table, with your knees bent.    The test is done with a hand-held probe. This is a short, slender héctor. It has a sterile, disposable cover. It is also greased (lubricated) with some gel. It is then gently placed inside your rectum.    You will feel pressure from the probe. If you feel pain, let your provider know.    If a biopsy is taken, it is done using a small probe with a very tiny needle on the end. This needle enters your prostate and removes several tiny samples of tissue. These samples are then sent to a lab to be examined. Any mild pain from the biopsy is usually minor.   After your test  Before leaving, you may need to wait for a short time while the images are reviewed. In most cases, you can go back to your normal routine after the test. If you had a biopsy, you may see some blood in your urine, sperm, or stool for a day or so. This is normal. Your health care provider will let you know when your test results are ready.  In some cases, a diagnosis can t be made from the tissue sample that was taken. If this happens, your provider will talk with you about whether you may need another biopsy. Or you may need a different procedure.  When to call your health care provider  Call your provider if you have:    Very bloody urine or stool    A fever lasting 24 to 48 hours    Any other symptoms that your  provider asks you to report, based on your medical condition     7840-5583 [employer]. [address] [place] [address], [place] [address]. All rights reserved. This information is not intended as a substitute for professional medical care. Always follow your healthcare professional's instructions.

## 2017-05-31 NOTE — NURSING NOTE
"Chief Complaint   Patient presents with     Results       Initial /78 (BP Location: Left arm, Patient Position: Chair, Cuff Size: Adult Regular)  Pulse 66  Resp 12 Estimated body mass index is 26.24 kg/(m^2) as calculated from the following:    Height as of 1/11/17: 1.676 m (5' 6\").    Weight as of 1/11/17: 73.8 kg (162 lb 9.6 oz).  Medication Reconciliation: complete     Pamela Jackson RN       "

## 2017-05-31 NOTE — PROGRESS NOTES
Chief Complaint   Patient presents with     Results       Tres Santana Sr is a 56 year old male who presents today for follow up of   Chief Complaint   Patient presents with     Results    f/u for elevated psa.  After abx, his symptoms are a little better however his psa has increased.  He has no f/n/v/dysuria.    Current Outpatient Prescriptions   Medication Sig Dispense Refill     oxybutynin (DITROPAN XL) 10 MG 24 hr tablet Take 1 tablet (10 mg) by mouth daily 30 tablet 1     lisinopril (PRINIVIL,ZESTRIL) 40 MG tablet Take 1 tablet (40 mg) by mouth daily 90 tablet 3     No Known Allergies   Past Medical History:   Diagnosis Date     Chronic kidney disease      Hypertension      Past Surgical History:   Procedure Laterality Date     APPENDECTOMY       BUNIONECTOMY      Bilateral feet     HERNIA REPAIR, INGUINAL RT/LT       Family History   Problem Relation Age of Onset     Hypertension Mother      Hypertension Father      CEREBROVASCULAR DISEASE Maternal Uncle      at age 60s     CANCER No family hx of      DIABETES No family hx of      Coronary Artery Disease No family hx of      Colon Cancer No family hx of      Prostate Cancer No family hx of      Social History     Social History     Marital status:      Spouse name: shara     Number of children: 5     Years of education: N/A     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[employer]      Social History Main Topics     Smoking status: Never Smoker     Smokeless tobacco: Never Used     Alcohol use 0.0 oz/week     0 Standard drinks or equivalent per week      Comment: social     Drug use: No     Sexual activity: Yes     Partners: Female     Birth control/ protection: None     Other Topics Concern     None     Social History Narrative       REVIEW OF SYSTEMS  =================  C: NEGATIVE for fever, chills, change in weight  I: NEGATIVE for worrisome rashes, moles or lesions  E/M: NEGATIVE for ear, mouth and throat problems  R: NEGATIVE for  significant cough or SHORTNESS OF BREATH,   CV: NEGATIVE for chest pain, palpitations or peripheral edema  GI: NEGATIVE for nausea, abdominal pain, heartburn, or change in bowel habits  NEURO: NEGATIVE any motor/sensory changes  PSYCH: NEGATIVE for recent mood disorder    Physical Exam:  /78 (BP Location: Left arm, Patient Position: Chair, Cuff Size: Adult Regular)  Pulse 66  Resp 12   Patient is pleasant, in no acute distress, good general condition.  Lung: no evidence of respiratory distress    Abdomen: Soft, nondistended, non tender. No masses. No rebound or guarding.   Exam: no cva tenderness  Skin: Warm and dry.  No redness.  Psych: normal mood and affect  Neuro: alert and oriented    Assessment/Plan:   (R97.20) Elevated prostate specific antigen (PSA)  (primary encounter diagnosis)  Comment: discussed psa elevation/prostate cancer  Plan: schedule for trus and biopsy           Risks of bleeding/infection discussed at length

## 2017-05-31 NOTE — MR AVS SNAPSHOT
After Visit Summary   5/31/2017    Tres Santana Sr    MRN: [id number]           Patient Information     Date Of Birth          1960        Visit Information        Provider Department      5/31/2017 10:15 AM Moises Daly MD [facility]        Today's Diagnoses     Elevated prostate specific antigen (PSA)    -  1      Care Instructions      Prostate Biopsy  Cancer occurs when abnormal cells form a tumor. A tumor is a lump of cells that grow uncontrolled. A core needle biopsy will be done if your health care provider thinks you have prostate cancer. A thin needle is used to remove small samples of prostate tissue. These samples are checked for cancer.        Taking tissue samples  A biopsy takes about 15 to 20 minutes. You may be given an enema or suppository before the biopsy to clear the bowels. Antibiotics are given at least 1 hour prior to the biopsy. During the procedure:    You will be given antibiotics to stop infection.    You may be given a sedative, local pain killer, or pain medicine.    A small probe is put into the rectum as you lie on your side. A picture of your prostate can then be seen on a monitor. This is called a transrectal ultrasound (TRUS).    Your health care provider will use the TRUS picture as a guide. He or she will use a thin needle to remove tiny tissue samples from some sites in the prostate.    These tissue samples are sent to the pathology department. They are looked at under a microscope so a diagnosis can be made.   Risks and complications of core needle biopsy    Infection    Blood in urine, stool, or semen    Pain   Home care  You may have had the biopsy done through your rectum, your urethra, or through the skin between your scrotum and rectum. Your health care provider will tell you what to do after the biopsy. These instructions are based on your health condition, the type of biopsy, and your provider s practices.    Your provider  may give you pain medicine or acetaminophen for discomfort or pain. Follow your provider s instructions for taking these medicines. Don t take aspirin or ibuprofen after the procedure. If you have a chronic liver or kidney disease, talk with your provider before taking these medicines. Also talk with your provider if you ve had a stomach ulcer or GI bleeding.     You may need to take antibiotic medicine for 1 to 2 days. This will help prevent an infection. Signs of an infection include chills, pain, or fever.    You may be told to drink 8 ounces of water every 30 minutes for 2 hours. This will help ease any discomfort. You can also take a warm bath or put a warm, damp washcloth over your urethra to help ease the pain.    You may see minor bleeding after the procedure. This is normal and usually needs no treatment. You may see blood in your urine or semen (rust color). You may also have light bleeding from your rectum if you have hemorrhoids.    Your provider will tell you when you can go back to your normal activities. This includes sex, exercise, and straining physically. Discuss these with your provider.  When to seek medical advice  Call your health care provider right away if any of these occur:    You aren t able to urinate, or see that you have less flow of urine    Chills and fever of 100.4 F (38 C)      Severe pain    Signs of your infection getting worse. These include worsening pain, pain in your side under the rib cage or in the low back, or foul-smelling urine.    Blood clots or bright red blood in your stool or urine    You feel confused or very tired    0988-5759 [employer]. [address] [place] [address], [place] [address]. All rights reserved. This information is not intended as a substitute for professional medical care. Always follow your healthcare professional's instructions.        Transrectal Ultrasound and Biopsy  A transrectal ultrasound is an imaging test. It uses sound waves to  create pictures of a man s prostate gland. Your prostate gland is in front of your rectum. For this test, a special probe (transducer) is placed directly into your rectum. During the test, tissue samples (a biopsy) may also be taken. The test is done by a specially trained technologist called a sonographer.    Getting ready for your test    You may be asked to clear your bowel before the test. This may be done by injecting liquid into your rectum (an enema). Or it can be done by drinking a special liquid.    You may be asked not to eat or drink anything after midnight the night before the test.    Tell your health care provider about any medicines, herbs, or supplements you are taking. This includes any over-the-counter medicines such as aspirin or ibuprofen.    Answer any questions your provider has about your medical history. This will help your provider tailor the test to your health needs.  During your test    You may be asked to change into a gown. You will then lie on your side on an exam table, with your knees bent.    The test is done with a hand-held probe. This is a short, slender héctor. It has a sterile, disposable cover. It is also greased (lubricated) with some gel. It is then gently placed inside your rectum.    You will feel pressure from the probe. If you feel pain, let your provider know.    If a biopsy is taken, it is done using a small probe with a very tiny needle on the end. This needle enters your prostate and removes several tiny samples of tissue. These samples are then sent to a lab to be examined. Any mild pain from the biopsy is usually minor.   After your test  Before leaving, you may need to wait for a short time while the images are reviewed. In most cases, you can go back to your normal routine after the test. If you had a biopsy, you may see some blood in your urine, sperm, or stool for a day or so. This is normal. Your health care provider will let you know when your test results are  ready.  In some cases, a diagnosis can t be made from the tissue sample that was taken. If this happens, your provider will talk with you about whether you may need another biopsy. Or you may need a different procedure.  When to call your health care provider  Call your provider if you have:    Very bloody urine or stool    A fever lasting 24 to 48 hours    Any other symptoms that your provider asks you to report, based on your medical condition     3353-5962 [employer]. [address] [place] [address], [place] [address]. All rights reserved. This information is not intended as a substitute for professional medical care. Always follow your healthcare professional's instructions.                Follow-ups after your visit        Your next 10 appointments already scheduled     Jun 20, 2017  9:50 AM CDT   US PROSTATE WITH BIOPSY with [id number]   [facility] [place] ([facility]    [address] [place] [place] [address] [address]   [phone]           Please bring a list of your medicines (including vitamins, minerals and over-the-counter drugs). Also, tell your doctor about any allergies you may have. Wear comfortable clothes and leave your valuables at home.  Take a Fleet enema two hours before your exam. Buy this at the drug store. Follow the instructions on the box.  Please bring or send the results of your most recent PSA test, along with the written report from your last rectal or prostate exam.  Please call the Imaging Department at your exam site with any questions.            Jun 20, 2017 10:00 AM CDT   Return Visit with Moises Daly MD   [facility] [place] ([facility] [place])    [address] [place] [address]  [place] [address]   [phone]            Jun 27, 2017 10:00 AM CDT   Return Visit with MD Elisabeth [facility] Renée Boss ([facility])    [address] [place] [place] [address] [address]   [phone]            Jul 14, 2017 10:00 AM CDT  "  Return Visit with Moises Daly MD   [facility] [place] ([facility])    [address] [facility]  [place] [id number] [phone]              Future tests that were ordered for you today     Open Future Orders        Priority Expected Expires Ordered    US Guided Needle Placement Routine 2017            Who to contact     If you have questions or need follow up information about today's clinic visit or your schedule please contact [facility] [place] directly at [phone].  Normal or non-critical lab and imaging results will be communicated to you by Wotohart, letter or phone within 4 business days after the clinic has received the results. If you do not hear from us within 7 days, please contact the clinic through Wotohart or phone. If you have a critical or abnormal lab result, we will notify you by phone as soon as possible.  Submit refill requests through [email or web address] or call your pharmacy and they will forward the refill request to us. Please allow 3 business days for your refill to be completed.          Additional Information About Your Visit        MyChart Information     [email or web address] lets you send messages to your doctor, view your test results, renew your prescriptions, schedule appointments and more. To sign up, go to [email or web address] . Click on \"Log in\" on the left side of the screen, which will take you to the Welcome page. Then click on \"Sign up Now\" on the right side of the page.     You will be asked to enter the access code listed below, as well as some personal information. Please follow the directions to create your username and password.     Your access code is: [email or web address]  Expires: 2017 10:46 AM     Your access code will  in 90 days. If you need help or a new code, please call your [facility] or [phone].        Care EveryWhere ID     This is your Care EveryWhere ID. This could be used by other organizations to " access your [place] medical records  [id number]        Your Vitals Were     Pulse Respirations                66 12           Blood Pressure from Last 3 Encounters:   05/31/17 120/78   05/10/17 124/80   04/11/17 138/84    Weight from Last 3 Encounters:   01/11/17 73.8 kg (162 lb 9.6 oz)   10/06/16 73.8 kg (162 lb 9.6 oz)   07/13/16 74.3 kg (163 lb 12.8 oz)              We Performed the Following     Perirectal Culture Aerobic Bacterial        Primary Care Provider Office Phone # Fax #    Elza Zapien [phone]       [facility] [place] [address]  [place] [address]        Thank you!     Thank you for choosing [facility]  for your care. Our goal is always to provide you with excellent care. Hearing back from our patients is one way we can continue to improve our services. Please take a few minutes to complete the written survey that you may receive in the mail after your visit with us. Thank you!             Your Updated Medication List - Protect others around you: Learn how to safely use, store and throw away your medicines at www.disposemymeds.org.          This list is accurate as of: 5/31/17 10:31 AM.  Always use your most recent med list.                   Brand Name Dispense Instructions for use    lisinopril 40 MG tablet    PRINIVIL/ZESTRIL    90 tablet    Take 1 tablet (40 mg) by mouth daily       oxybutynin 10 MG 24 hr tablet    DITROPAN XL    30 tablet    Take 1 tablet (10 mg) by mouth daily

## 2017-06-20 NOTE — MR AVS SNAPSHOT
"              After Visit Summary   6/20/2017    Tres Santana Sr.    MRN: [id number]           Patient Information     Date Of Birth          1960        Visit Information        Provider Department      6/20/2017 10:00 AM Moises Daly MD [facility]        Today's Diagnoses     Elevated prostate specific antigen (PSA)    -  1       Follow-ups after your visit        Your next 10 appointments already scheduled     Jun 27, 2017 10:00 AM CDT   Return Visit with Moises Daly MD   [facility] ([address] [address]   [phone]            Jul 14, 2017 10:00 AM CDT   Return Visit with Moises Daly MD   [facility] ([address] [address]   [phone]              Who to contact     If you have questions or need follow up information about today's clinic visit or your schedule please contact [facility] directly at [phone].  Normal or non-critical lab and imaging results will be communicated to you by [facility], letter or phone within 4 business days after the clinic has received the results. If you do not hear from us within 7 days, please contact the clinic through [facility] or phone. If you have a critical or abnormal lab result, we will notify you by phone as soon as possible.  Submit refill requests through [employer] or call your pharmacy and they will forward the refill request to us. Please allow 3 business days for your refill to be completed.          Additional Information About Your Visit        MyChart Information     [employer] lets you send messages to your doctor, view your test results, renew your prescriptions, schedule appointments and more. To sign up, go to [email or web address] . Click on \"Log in\" on the left side of the screen, which will take you to the Welcome page. Then click on \"Sign up Now\" on the right side of " the page.     You will be asked to enter the access code listed below, as well as some personal information. Please follow the directions to create your username and password.     Your access code is: [email or web address]  Expires: 2017 10:46 AM     Your access code will  in 90 days. If you need help or a new code, please call your [place] clinic or [phone].        Care EveryWhere ID     This is your Care EveryWhere ID. This could be used by other organizations to access your [place] medical records  [id number]         Blood Pressure from Last 3 Encounters:   17 120/78   05/10/17 124/80   17 138/84    Weight from Last 3 Encounters:   17 73.8 kg (162 lb 9.6 oz)   10/06/16 73.8 kg (162 lb 9.6 oz)   16 74.3 kg (163 lb 12.8 oz)              We Performed the Following     BIOPSY PROSTATE NEEDLE/PUNCH     C HANDLING LAB/BLOOD COLLECTION     Surgical pathology exam        Primary Care Provider Office Phone # Fax #    Elza Zapien [phone]       [facility] [place] [address]  [place] [address]        Thank you!     Thank you for choosing [facility]  for your care. Our goal is always to provide you with excellent care. Hearing back from our patients is one way we can continue to improve our services. Please take a few minutes to complete the written survey that you may receive in the mail after your visit with us. Thank you!             Your Updated Medication List - Protect others around you: Learn how to safely use, store and throw away your medicines at www.disposemymeds.org.          This list is accurate as of: 17 10:13 AM.  Always use your most recent med list.                   Brand Name Dispense Instructions for use    lisinopril 40 MG tablet    PRINIVIL/ZESTRIL    90 tablet    Take 1 tablet (40 mg) by mouth daily       oxybutynin 10 MG 24 hr tablet    DITROPAN XL    30 tablet    Take 1 tablet (10 mg) by mouth daily

## 2017-06-20 NOTE — PROGRESS NOTES
Patient is here for prostate biopsy.  He was placed in the dorsal lithotomy position.  Prostate ultrasound placed transrectally.  The neurovascular bundle was anesthetized using 1% lidocaine.  Prostate measurements obtained.  Prostate size is 62 cc.  12 biopsies obtained under guidance of prostate ultrasound using biopsy needle.  Patient tolerated procedure.  Return to clinic in one week for biopsy result.

## 2017-06-27 NOTE — NURSING NOTE
"Chief Complaint   Patient presents with     RECHECK     Biopsy results       Initial /76 (BP Location: Right arm, Patient Position: Chair, Cuff Size: Adult Regular)  Pulse 62  Resp 16 Estimated body mass index is 26.24 kg/(m^2) as calculated from the following:    Height as of 1/11/17: 1.676 m (5' 6\").    Weight as of 1/11/17: 73.8 kg (162 lb 9.6 oz).  Medication Reconciliation: complete   Kamari Kelley CMA      "

## 2017-06-27 NOTE — MR AVS SNAPSHOT
After Visit Summary   6/27/2017    Tres Santana Sr.    MRN: [id number]           Patient Information     Date Of Birth          1960        Visit Information        Provider Department      6/27/2017 10:00 AM Moises Daly MD [facility]        Today's Diagnoses     Elevated prostate specific antigen (PSA)    -  1    Benign non-nodular prostatic hyperplasia with lower urinary tract symptoms           Follow-ups after your visit        Your next 10 appointments already scheduled     Jun 27, 2017 10:00 AM CDT   Return Visit with Moises Daly MD   [facility] ([facility])    [address] [place] [address]   [phone]            Jul 14, 2017 10:00 AM CDT   Return Visit with Moises Daly MD   [facility] ([address] [address]   [phone]              Future tests that were ordered for you today     Open Future Orders        Priority Expected Expires Ordered    PSA total and free [MXR891] Routine 12/27/2017 6/27/2018 6/27/2017            Who to contact     If you have questions or need follow up information about today's clinic visit or your schedule please contact [facility] directly at [phone].  Normal or non-critical lab and imaging results will be communicated to you by MyChart, letter or phone within 4 business days after the clinic has received the results. If you do not hear from us within 7 days, please contact the clinic through [facility] or phone. If you have a critical or abnormal lab result, we will notify you by phone as soon as possible.  Submit refill requests through [employer] or call your pharmacy and they will forward the refill request to us. Please allow 3 business days for your refill to be completed.          Additional Information About Your Visit        [employer] Information     [employer] lets you send messages to your  "doctor, view your test results, renew your prescriptions, schedule appointments and more. To sign up, go to [email or web address] . Click on \"Log in\" on the left side of the screen, which will take you to the Welcome page. Then click on \"Sign up Now\" on the right side of the page.     You will be asked to enter the access code listed below, as well as some personal information. Please follow the directions to create your username and password.     Your access code is: [email or web address]  Expires: 2017 10:46 AM     Your access code will  in 90 days. If you need help or a new code, please call your [place] clinic or [phone].        Care EveryWhere ID     This is your Care EveryWhere ID. This could be used by other organizations to access your [place] medical records  [id number]        Your Vitals Were     Pulse Respirations                62 16           Blood Pressure from Last 3 Encounters:   17 118/76   17 120/78   05/10/17 124/80    Weight from Last 3 Encounters:   17 73.8 kg (162 lb 9.6 oz)   10/06/16 73.8 kg (162 lb 9.6 oz)   16 74.3 kg (163 lb 12.8 oz)               Primary Care Provider Office Phone # Fax #    Elza Zapien [phone]       [facility] [place] [address]  [place] [address]        Equal Access to Services     [facility] AH: Hadii aad ku hadasho Soomaali, waaxda luqadaha, qaybta kaalmada adeegyada, waxay jennifer flor . So [facility] [phone].    ATENCIÓN: Si habla español, tiene a coleman disposición servicios gratuitos de asistencia lingüística. Llame al [phone].    We comply with applicable federal civil rights laws and [place] laws. We do not discriminate on the basis of race, color, national origin, age, disability sex, sexual orientation or gender identity.            Thank you!     Thank you for choosing [facility]  for your care. Our goal is always to provide you with excellent " care. Hearing back from our patients is one way we can continue to improve our services. Please take a few minutes to complete the written survey that you may receive in the mail after your visit with us. Thank you!             Your Updated Medication List - Protect others around you: Learn how to safely use, store and throw away your medicines at www.disposemymeds.org.          This list is accurate as of: 6/27/17  9:58 AM.  Always use your most recent med list.                   Brand Name Dispense Instructions for use Diagnosis    lisinopril 40 MG tablet    PRINIVIL/ZESTRIL    90 tablet    Take 1 tablet (40 mg) by mouth daily    Essential hypertension with goal blood pressure less than 140/90       oxybutynin 10 MG 24 hr tablet    DITROPAN XL    30 tablet    Take 1 tablet (10 mg) by mouth daily    Increased frequency of urination

## 2017-06-27 NOTE — PROGRESS NOTES
Chief Complaint   Patient presents with     RECHECK     Biopsy results       Tres Santana Sr. is a 56 year old male who presents today for follow up of   Chief Complaint   Patient presents with     RECHECK     Biopsy results    f/u after recent biopsy of prostate.  He is without any complaints.  He is on ditropan for urinary frequency.    Current Outpatient Prescriptions   Medication Sig Dispense Refill     oxybutynin (DITROPAN XL) 10 MG 24 hr tablet Take 1 tablet (10 mg) by mouth daily 30 tablet 1     lisinopril (PRINIVIL,ZESTRIL) 40 MG tablet Take 1 tablet (40 mg) by mouth daily 90 tablet 3     No Known Allergies   Past Medical History:   Diagnosis Date     Chronic kidney disease      Hypertension      Past Surgical History:   Procedure Laterality Date     APPENDECTOMY       BUNIONECTOMY      Bilateral feet     HERNIA REPAIR, INGUINAL RT/LT       Family History   Problem Relation Age of Onset     Hypertension Mother      Hypertension Father      CEREBROVASCULAR DISEASE Maternal Uncle      at age 60s     CANCER No family hx of      DIABETES No family hx of      Coronary Artery Disease No family hx of      Colon Cancer No family hx of      Prostate Cancer No family hx of      Social History     Social History     Marital status:      Spouse name: shara     Number of children: 5     Years of education: N/A     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[employer]      Social History Main Topics     Smoking status: Never Smoker     Smokeless tobacco: Never Used     Alcohol use 0.0 oz/week     0 Standard drinks or equivalent per week      Comment: social     Drug use: No     Sexual activity: Yes     Partners: Female     Birth control/ protection: None     Other Topics Concern     None     Social History Narrative       REVIEW OF SYSTEMS  =================  C: NEGATIVE for fever, chills, change in weight  I: NEGATIVE for worrisome rashes, moles or lesions  E/M: NEGATIVE for ear, mouth and throat  problems  R: NEGATIVE for significant cough or SHORTNESS OF BREATH,   CV: NEGATIVE for chest pain, palpitations or peripheral edema  GI: NEGATIVE for nausea, abdominal pain, heartburn, or change in bowel habits  NEURO: NEGATIVE any motor/sensory changes  PSYCH: NEGATIVE for recent mood disorder    Physical Exam:  /76 (BP Location: Right arm, Patient Position: Chair, Cuff Size: Adult Regular)  Pulse 62  Resp 16   Patient is pleasant, in no acute distress, good general condition.  Lung: no evidence of respiratory distress    Abdomen: Soft, nondistended, non tender. No masses. No rebound or guarding.   Exam: no cva tenderness  Skin: Warm and dry.  No redness.  Psych: normal mood and affect  Neuro: alert and oriented  Patient Name: LYNN CAM   MR#: [id number]   Specimen #: [id number]   Collected: 6/20/2017   Received: 6/21/2017   Reported: 6/22/2017 19:29   Ordering Phy(s): ELAINA WALLACE     For improved result formatting, select 'View Enhanced Report Format'   under Linked Documents section.     SPECIMEN(S):   A: Prostate biopsy, left   B: Prostate biopsy, right     FINAL DIAGNOSIS:   A. Prostate biopsy, left:   - Negative for malignancy without inflammation.     B. Prostate biopsy, right:   - Negative for malignancy with minimal focal chronic inflammation.     Electronically signed out by:     WILMAN Oviedo M.D.   Assessment/Plan:   (R97.20) Elevated prostate specific antigen (PSA)  (primary encounter diagnosis)  Comment: no cancer found  Plan: PSA total and free [QLY639]        In six months    (N40.1) Benign non-nodular prostatic hyperplasia with lower urinary tract symptoms  Comment:    Plan:  Cont with ditropan

## 2017-07-26 NOTE — TELEPHONE ENCOUNTER
Oxybutynin ER 10mg      Last Written Prescription Date: 05/10/17  Last Fill Quantity: 30,  # refills: 1   Last Office Visit with [facility], [facility] or [facility] prescribing provider: 06/27/17    Thank You,  Elvira Huitron, Pharmacy Tech  [place]

## 2017-10-26 NOTE — TELEPHONE ENCOUNTER
lisinopril (PRINIVIL,ZESTRIL) 40 MG          Last Written Prescription Date: 07/13/16  Last Fill Quantity: 90, # refills: 3    Last Office Visit with G, P or [facility] prescribing provider:  01/11/17   Future Office Visit:    Next 5 appointments (look out 90 days)     Dec 19, 2017 10:00 AM CST   Return Visit with Moises Daly MD   [facility] [address]   [phone]                  BP Readings from Last 3 Encounters:   06/27/17 118/76   05/31/17 120/78   05/10/17 124/80       Thank You,  Elvira Huitron, Pharmacy Tech  [facility]